# Patient Record
Sex: FEMALE | Race: WHITE | Employment: FULL TIME | URBAN - METROPOLITAN AREA
[De-identification: names, ages, dates, MRNs, and addresses within clinical notes are randomized per-mention and may not be internally consistent; named-entity substitution may affect disease eponyms.]

---

## 2017-02-07 PROBLEM — D22.5: Status: ACTIVE | Noted: 2017-02-07

## 2017-02-07 PROBLEM — D22.9 HALO NEVUS: Status: ACTIVE | Noted: 2017-02-07

## 2017-02-07 PROBLEM — L82.1 SEBORRHEIC KERATOSES: Status: ACTIVE | Noted: 2017-02-07

## 2017-02-07 PROBLEM — D23.5 BENIGN NEOPLASM OF SKIN OF TRUNK: Status: ACTIVE | Noted: 2017-02-07

## 2017-02-11 ENCOUNTER — APPOINTMENT (OUTPATIENT)
Dept: GENERAL RADIOLOGY | Age: 47
End: 2017-02-11
Attending: EMERGENCY MEDICINE
Payer: COMMERCIAL

## 2017-02-11 ENCOUNTER — HOSPITAL ENCOUNTER (OUTPATIENT)
Facility: HOSPITAL | Age: 47
Setting detail: OBSERVATION
Discharge: HOME | End: 2017-02-13
Attending: EMERGENCY MEDICINE | Admitting: INTERNAL MEDICINE
Payer: COMMERCIAL

## 2017-02-11 DIAGNOSIS — R07.9 ACUTE CHEST PAIN: Primary | ICD-10-CM

## 2017-02-11 LAB
ALBUMIN SERPL-MCNC: 4.2 G/DL (ref 3.5–4.8)
ALP LIVER SERPL-CCNC: 76 U/L (ref 39–100)
ALT SERPL-CCNC: 23 U/L (ref 14–54)
AST SERPL-CCNC: 15 U/L (ref 15–41)
ATRIAL RATE: 66 BPM
ATRIAL RATE: 79 BPM
BASOPHILS # BLD AUTO: 0.04 X10(3) UL (ref 0–0.1)
BASOPHILS NFR BLD AUTO: 0.9 %
BILIRUB SERPL-MCNC: 0.7 MG/DL (ref 0.1–2)
BUN BLD-MCNC: 17 MG/DL (ref 8–20)
CALCIUM BLD-MCNC: 9 MG/DL (ref 8.3–10.3)
CHLORIDE: 107 MMOL/L (ref 101–111)
CK: 66 IU/L (ref 26–192)
CO2: 28 MMOL/L (ref 22–32)
CREAT BLD-MCNC: 0.86 MG/DL (ref 0.55–1.02)
D-DIMER: <0.27 UG/ML FEU (ref 0–0.49)
EOSINOPHIL # BLD AUTO: 0.04 X10(3) UL (ref 0–0.3)
EOSINOPHIL NFR BLD AUTO: 0.9 %
ERYTHROCYTE [DISTWIDTH] IN BLOOD BY AUTOMATED COUNT: 12.9 % (ref 11.5–16)
GLUCOSE BLD-MCNC: 91 MG/DL (ref 70–99)
HCT VFR BLD AUTO: 41.7 % (ref 34–50)
HGB BLD-MCNC: 13.6 G/DL (ref 12–16)
IMMATURE GRANULOCYTE COUNT: 0.01 X10(3) UL (ref 0–1)
IMMATURE GRANULOCYTE RATIO %: 0.2 %
LYMPHOCYTES # BLD AUTO: 1.56 X10(3) UL (ref 0.9–4)
LYMPHOCYTES NFR BLD AUTO: 33.3 %
M PROTEIN MFR SERPL ELPH: 7.8 G/DL (ref 6.1–8.3)
MCH RBC QN AUTO: 28.1 PG (ref 27–33.2)
MCHC RBC AUTO-ENTMCNC: 32.6 G/DL (ref 31–37)
MCV RBC AUTO: 86.2 FL (ref 81–100)
MONOCYTES # BLD AUTO: 0.3 X10(3) UL (ref 0.1–0.6)
MONOCYTES NFR BLD AUTO: 6.4 %
NEUTROPHIL ABS PRELIM: 2.74 X10 (3) UL (ref 1.3–6.7)
NEUTROPHILS # BLD AUTO: 2.74 X10(3) UL (ref 1.3–6.7)
NEUTROPHILS NFR BLD AUTO: 58.3 %
P AXIS: 45 DEGREES
P AXIS: 46 DEGREES
P-R INTERVAL: 156 MS
P-R INTERVAL: 164 MS
PLATELET # BLD AUTO: 257 10(3)UL (ref 150–450)
POTASSIUM SERPL-SCNC: 3.9 MMOL/L (ref 3.6–5.1)
Q-T INTERVAL: 352 MS
Q-T INTERVAL: 408 MS
QRS DURATION: 94 MS
QRS DURATION: 96 MS
QTC CALCULATION (BEZET): 403 MS
QTC CALCULATION (BEZET): 427 MS
R AXIS: 38 DEGREES
R AXIS: 44 DEGREES
RBC # BLD AUTO: 4.84 X10(6)UL (ref 3.8–5.1)
RED CELL DISTRIBUTION WIDTH-SD: 40.6 FL (ref 35.1–46.3)
SODIUM SERPL-SCNC: 143 MMOL/L (ref 136–144)
T AXIS: 12 DEGREES
T AXIS: 26 DEGREES
TROPONIN: <0.046 NG/ML (ref ?–0.05)
VENTRICULAR RATE: 66 BPM
VENTRICULAR RATE: 79 BPM
WBC # BLD AUTO: 4.7 X10(3) UL (ref 4–13)

## 2017-02-11 PROCEDURE — 93005 ELECTROCARDIOGRAM TRACING: CPT

## 2017-02-11 PROCEDURE — 85378 FIBRIN DEGRADE SEMIQUANT: CPT | Performed by: HOSPITALIST

## 2017-02-11 PROCEDURE — 80053 COMPREHEN METABOLIC PANEL: CPT | Performed by: EMERGENCY MEDICINE

## 2017-02-11 PROCEDURE — 93010 ELECTROCARDIOGRAM REPORT: CPT

## 2017-02-11 PROCEDURE — 85025 COMPLETE CBC W/AUTO DIFF WBC: CPT | Performed by: EMERGENCY MEDICINE

## 2017-02-11 PROCEDURE — 99285 EMERGENCY DEPT VISIT HI MDM: CPT

## 2017-02-11 PROCEDURE — 96360 HYDRATION IV INFUSION INIT: CPT

## 2017-02-11 PROCEDURE — 82550 ASSAY OF CK (CPK): CPT | Performed by: INTERNAL MEDICINE

## 2017-02-11 PROCEDURE — 71010 XR CHEST AP PORTABLE  (CPT=71010): CPT

## 2017-02-11 PROCEDURE — 84484 ASSAY OF TROPONIN QUANT: CPT | Performed by: INTERNAL MEDICINE

## 2017-02-11 PROCEDURE — 84484 ASSAY OF TROPONIN QUANT: CPT | Performed by: EMERGENCY MEDICINE

## 2017-02-11 RX ORDER — ACETAMINOPHEN 500 MG
1000 TABLET ORAL ONCE
Status: COMPLETED | OUTPATIENT
Start: 2017-02-11 | End: 2017-02-11

## 2017-02-11 RX ORDER — ASPIRIN 81 MG/1
81 TABLET ORAL DAILY
Status: DISCONTINUED | OUTPATIENT
Start: 2017-02-11 | End: 2017-02-13

## 2017-02-11 RX ORDER — ASPIRIN 81 MG/1
324 TABLET, CHEWABLE ORAL ONCE
Status: COMPLETED | OUTPATIENT
Start: 2017-02-11 | End: 2017-02-11

## 2017-02-11 RX ORDER — NITROGLYCERIN 0.4 MG/1
0.4 TABLET SUBLINGUAL EVERY 5 MIN PRN
Status: DISCONTINUED | OUTPATIENT
Start: 2017-02-11 | End: 2017-02-13

## 2017-02-11 NOTE — CONSULTS
[facility] Cardiology  Report of Consultation    Brent Sun Patient Status:  Observation    10/27/1970 MRN [id number]   [facility] 2NE-A Attending Deanna Davies MD   Hosp Day # 0 PCP Leora Perez MD equivalent, 1 Glasses of wine per week       Comment: Social-rare             Medications:   Scheduled:        Continuous Infusion:        PRN Medications:   nitroGLYCERIN    Outpatient Medications:     No current facility-administered medications on file gross deficit appreciated. Integument:  No visible rashes are appreciated.       Laboratories and Data:   Labs:    Recent Labs   Lab  02/11/17   1026   GLU  91   BUN  17   CREATSERUM  0.86   CA  9.0   ALB  4.2   NA  143   K  3.9   CL  107   CO2  28.0   ALK

## 2017-02-11 NOTE — ED PROVIDER NOTES
Patient Seen in: THE [facility] Emergency Department In [place]    History   Patient presents with:  Chest Pain Angina (cardiovascular)    Stated Complaint: chest pain     HPI  Patient is a 44-year-old female who states that she was feeling fine this morning 0 Standard drinks or equivalent per week       Comment: Maxwell Larios   denies drugs, no alcohol    Review of Systems    Positive for stated complaint: chest pain   Other systems are as noted in HPI. Constitutional and vital signs reviewed.       All othe Final result                 Please view results for these tests on the individual orders.    RAINBOW DRAW BLUE   RAINBOW DRAW GOLD   RAINBOW DRAW LAVENDER   RAINBOW DRAW LIGHT GREEN   CBC W/ DIFFERENTIAL      EKG    Rate, intervals a

## 2017-02-11 NOTE — PLAN OF CARE
Problem: NEUROLOGICAL - ADULT  Goal: Achieves stable or improved neurological status  INTERVENTIONS  - Assess for and report changes in neurological status  - Initiate measures to prevent increased intracranial pressure  - Maintain blood pressure and fluid signs, obtain 12 lead EKG if indicated  - Evaluate effectiveness of antiarrhythmic and heart rate control medications as ordered  - Initiate emergency measures for life threatening arrhythmias  - Monitor electrolytes and administer replacement therapy as o

## 2017-02-11 NOTE — H&P
[facility] Hospitalist History and Physical      CC: Chest pain    PCP: Carlos Nguyen MD        History of Present Illness: Patient is a 51year old female with hx of hyperparathyroidism and kidney stones s/p partial parathyroidectomy who presented to the P bruising  Neuro: Negative for dizziness or falls    All other systems reviewed and are negative.      Objective:  /79 mmHg  Pulse 75  Temp(Src) 98.2 °F (36.8 °C) (Oral)  Resp 18  Ht 167.6 cm (5' 6\")  Wt 151 lb 0.2 oz (68.5 kg)  BMI 24.39 kg/m2  SpO2 EKG looks OK  - ECHO pending  - She has some mild pain when I saw her with deep breaths- check Ddimer if positive check CTA  - She was given ASA    FEN:  - General diet  - Lytes prn    Proph:  - DVT proph with SCDs    Dispo:  - [facility] hospitalist service  - C

## 2017-02-11 NOTE — PLAN OF CARE
NURSING ADMISSION NOTE      Patient admitted via ambulance from [address] [place] [address] Road to room. Safety precautions initiated. Bed in low position. Call light in reach.   Denies any chest pain at this time  Plan of care updated wit patient  Verbalize

## 2017-02-11 NOTE — ED NOTES
Updated this patient on the plan of care.  THE [facility] ambulance contacted for transport

## 2017-02-12 ENCOUNTER — APPOINTMENT (OUTPATIENT)
Dept: CV DIAGNOSTICS | Facility: HOSPITAL | Age: 47
End: 2017-02-12
Attending: INTERNAL MEDICINE
Payer: COMMERCIAL

## 2017-02-12 LAB
ATRIAL RATE: 65 BPM
CK: 64 IU/L (ref 26–192)
P AXIS: 41 DEGREES
P-R INTERVAL: 164 MS
Q-T INTERVAL: 410 MS
QRS DURATION: 94 MS
QTC CALCULATION (BEZET): 426 MS
R AXIS: 44 DEGREES
T AXIS: 23 DEGREES
TROPONIN: <0.046 NG/ML (ref ?–0.05)
VENTRICULAR RATE: 65 BPM

## 2017-02-12 PROCEDURE — 93005 ELECTROCARDIOGRAM TRACING: CPT

## 2017-02-12 PROCEDURE — 93306 TTE W/DOPPLER COMPLETE: CPT | Performed by: INTERNAL MEDICINE

## 2017-02-12 PROCEDURE — 93010 ELECTROCARDIOGRAM REPORT: CPT | Performed by: INTERNAL MEDICINE

## 2017-02-12 PROCEDURE — 36415 COLL VENOUS BLD VENIPUNCTURE: CPT

## 2017-02-12 PROCEDURE — 93306 TTE W/DOPPLER COMPLETE: CPT

## 2017-02-12 RX ORDER — ACETAMINOPHEN 325 MG/1
650 TABLET ORAL EVERY 6 HOURS PRN
Status: DISCONTINUED | OUTPATIENT
Start: 2017-02-12 | End: 2017-02-13

## 2017-02-12 NOTE — PLAN OF CARE
CARDIOVASCULAR - ADULT    • Maintains optimal cardiac output and hemodynamic stability Progressing    • Absence of cardiac arrhythmias or at baseline Progressing        GENITOURINARY - ADULT    • Absence of urinary retention Progressing        NEUROLOGICAL

## 2017-02-12 NOTE — PLAN OF CARE
Problem: NEUROLOGICAL - ADULT  Goal: Achieves stable or improved neurological status  INTERVENTIONS  - Assess for and report changes in neurological status  - Initiate measures to prevent increased intracranial pressure  - Maintain blood pressure and fluid temperature  - Assess for signs of decreased coronary artery perfusion - ex.  Angina  - Evaluate fluid balance, assess for edema, trend weights   Outcome: Progressing  Denies chest pain or shortness of breath  Goal: Absence of cardiac arrhythmias or at base withpatient  Stress   Echo tomorrow  2DEcho this morning

## 2017-02-12 NOTE — PROGRESS NOTES
[facility] Cardiology Progress Note        Pillo Quevedo Patient Status:  Observation    10/27/1970 MRN [id number]   [facility] 2NE-A Attending Mando Callejas Day # 1 PCP Geneva Brady, ischemia              -Normal initial CV isoenzymes  2.   6/16  3.  H/O hyperparathyroidism s/p partial parathyroidectomy        Plan:  1.  ASA  2.  troponins negative x3, EKG stable   3.  Echo pending   4.  stress echo tomorrow          WBEENA Mcdonough

## 2017-02-13 ENCOUNTER — APPOINTMENT (OUTPATIENT)
Dept: CV DIAGNOSTICS | Facility: HOSPITAL | Age: 47
End: 2017-02-13
Attending: NURSE PRACTITIONER
Payer: COMMERCIAL

## 2017-02-13 VITALS
DIASTOLIC BLOOD PRESSURE: 67 MMHG | BODY MASS INDEX: 24.27 KG/M2 | WEIGHT: 151 LBS | HEIGHT: 66 IN | TEMPERATURE: 98 F | SYSTOLIC BLOOD PRESSURE: 119 MMHG | HEART RATE: 79 BPM | RESPIRATION RATE: 18 BRPM | OXYGEN SATURATION: 98 %

## 2017-02-13 PROCEDURE — 93018 CV STRESS TEST I&R ONLY: CPT | Performed by: INTERNAL MEDICINE

## 2017-02-13 PROCEDURE — 93350 STRESS TTE ONLY: CPT | Performed by: INTERNAL MEDICINE

## 2017-02-13 PROCEDURE — 93017 CV STRESS TEST TRACING ONLY: CPT

## 2017-02-13 PROCEDURE — 93350 STRESS TTE ONLY: CPT

## 2017-02-13 NOTE — PLAN OF CARE
Discharge order received; ok to discharge per cardiology. Discharge instructions given to patient who stated to understand. IV removed with no problems. VSS. Pt stable, waiting for wheelchair.

## 2017-02-13 NOTE — PROGRESS NOTES
PRELIMINARY CARDIODIAGNOSTICS REPORT    1.5 mm ST depression with exercise noted. No symptoms or arrhythmias. Pt walked for 8:01 achieving a max heart rate of 171 (99% APMHR). Peak BP was 192/82. Test was terminated due to fatigue. Echo pending.

## 2017-02-13 NOTE — PROGRESS NOTES
[facility] Hospitalist Progress Note                                                                   [address]  10/27/1970    CC: FU CP    Interval History:  - Feels well, no recurre repeat EKG looks OK  - ECHO reviewed, no RWMA, nl EF  - She has some mild pain when I saw her with deep breaths on admit- Ddimer negative, no recurrence  - She was given ASA  - Plan for stress test in AM per Cardiology, discussed plan or care with Dr. Jeff Lee

## 2017-02-13 NOTE — PLAN OF CARE
Patient stable, denies any chest pain or shortness of breath, no distress noted. VSS. Stress echo this am.      Tele: NSR, no arrhythmias noted. No edema. Lungs: Clear, RA, Sp02 >96%. Respirations easy and regular.   GI: Abd soft, non-tender, bowel gwen

## 2017-02-13 NOTE — PAYOR COMM NOTE
Attending Physician: Brittany Mancera,*    Review Type: ADMISSION   Reviewer: Sonia Landaverde       Date: February 13, 2017 - 7:54 AM  Payor: [address] [place] [address]  Authorization Number: [id number]  Admit date: 2/11/2017 10:16 AM   Admitted from Emergency venous thrombolism with a negative predictive value of  approximately 95% when results are used as part of the total clinical  evaluation of the patient.     1st Trimester:  0.05-0.95 ug/mL (FEU)     2nd Trimester:  0.32-1.29 ug/mL (FEU)    3rd Trimester:

## 2017-02-14 NOTE — DISCHARGE SUMMARY
[facility] Internal Medicine Discharge Summary   Patient ID:  Amadeo Wells  [id number]  51year old  10/27/1970    Admit date: 2/11/2017    Discharge date and time: 2/13/2017     Attending Physician: Dr. Laura Robertson    Primary Care Physician: Adolphus Hammans, Succinate 50 MG Tabs   Commonly known as:  IMITREX   One po prn migraine. May repeat dose in 2 hrs prn.  Max 2 pills per day       ValACYclovir HCl 1 G Tabs   Commonly known as:  VALTREX   2 tablets by mouth every 12 hours for 1 day at onset of symptoms

## 2017-02-16 PROCEDURE — 82306 VITAMIN D 25 HYDROXY: CPT | Performed by: PHYSICIAN ASSISTANT

## 2017-02-16 PROCEDURE — 84443 ASSAY THYROID STIM HORMONE: CPT | Performed by: PHYSICIAN ASSISTANT

## 2017-02-16 PROCEDURE — 36415 COLL VENOUS BLD VENIPUNCTURE: CPT | Performed by: PHYSICIAN ASSISTANT

## 2017-06-21 PROCEDURE — 83001 ASSAY OF GONADOTROPIN (FSH): CPT | Performed by: INTERNAL MEDICINE

## 2017-06-21 PROCEDURE — 82607 VITAMIN B-12: CPT | Performed by: PHYSICIAN ASSISTANT

## 2017-06-21 PROCEDURE — 82746 ASSAY OF FOLIC ACID SERUM: CPT | Performed by: PHYSICIAN ASSISTANT

## 2017-08-24 ENCOUNTER — HOSPITAL ENCOUNTER (OUTPATIENT)
Age: 47
Discharge: HOME | End: 2017-08-24
Attending: FAMILY MEDICINE
Payer: COMMERCIAL

## 2017-08-24 ENCOUNTER — APPOINTMENT (OUTPATIENT)
Dept: CT IMAGING | Age: 47
End: 2017-08-24
Attending: FAMILY MEDICINE
Payer: COMMERCIAL

## 2017-08-24 VITALS
DIASTOLIC BLOOD PRESSURE: 69 MMHG | HEART RATE: 69 BPM | RESPIRATION RATE: 20 BRPM | OXYGEN SATURATION: 97 % | TEMPERATURE: 98 F | SYSTOLIC BLOOD PRESSURE: 118 MMHG

## 2017-08-24 DIAGNOSIS — N20.0 RENAL LITHIASIS: ICD-10-CM

## 2017-08-24 DIAGNOSIS — R10.32 LLQ ABDOMINAL PAIN: Primary | ICD-10-CM

## 2017-08-24 LAB
#LYMPHOCYTE IC: 1.2 X10ˆ3/UL (ref 0.9–3.2)
#MXD IC: 0.6 X10ˆ3/UL (ref 0.1–1)
#NEUTROPHIL IC: 11 X10ˆ3/UL (ref 1.3–6.7)
CREAT SERPL-MCNC: 0.8 MG/DL (ref 0.4–1)
GLUCOSE BLD-MCNC: 113 MG/DL (ref 65–99)
HCT IC: 43.6 % (ref 37–54)
HGB IC: 14.3 G/DL (ref 11.7–16)
ISTAT BLOOD GAS TCO2: 26 MMOL/L (ref 22–32)
ISTAT BUN: 14 MG/DL (ref 8–20)
ISTAT CHLORIDE: 101 MMOL/L (ref 101–111)
ISTAT HEMATOCRIT: 45 % (ref 37–54)
ISTAT IONIZED CALCIUM: 1.21 MMOL/L (ref 1.12–1.32)
ISTAT POTASSIUM: 3.7 MMOL/L (ref 3.6–5.1)
ISTAT SODIUM: 142 MMOL/L (ref 136–144)
LYMPHOCYTES NFR BLD AUTO: 9.5 %
MCH IC: 28.5 PG (ref 27–33.2)
MCHC IC: 32.8 G/DL (ref 31–37)
MCV IC: 87 FL (ref 81–100)
MIXED CELL %: 5 %
NEUTROPHILS NFR BLD AUTO: 85.5 %
PLT IC: 254 X10ˆ3/UL (ref 150–450)
POCT BILIRUBIN URINE: NEGATIVE
POCT GLUCOSE URINE: NEGATIVE MG/DL
POCT KETONE URINE: NEGATIVE MG/DL
POCT LEUKOCYTE ESTERASE URINE: NEGATIVE
POCT NITRITE URINE: NEGATIVE
POCT PH URINE: 5.5 (ref 5–8)
POCT PROTEIN URINE: 30 MG/DL
POCT SPECIFIC GRAVITY URINE: 1.02
POCT URINE COLOR: YELLOW
POCT URINE PREGNANCY: NEGATIVE
POCT UROBILINOGEN URINE: 0.2 MG/DL
RBC IC: 5.01 X10ˆ6/UL (ref 3.8–5.1)
WBC IC: 12.8 X10ˆ3/UL (ref 4–13)

## 2017-08-24 PROCEDURE — 81025 URINE PREGNANCY TEST: CPT | Performed by: FAMILY MEDICINE

## 2017-08-24 PROCEDURE — 80047 BASIC METABLC PNL IONIZED CA: CPT

## 2017-08-24 PROCEDURE — 85025 COMPLETE CBC W/AUTO DIFF WBC: CPT | Performed by: FAMILY MEDICINE

## 2017-08-24 PROCEDURE — 74176 CT ABD & PELVIS W/O CONTRAST: CPT | Performed by: FAMILY MEDICINE

## 2017-08-24 PROCEDURE — 81002 URINALYSIS NONAUTO W/O SCOPE: CPT | Performed by: FAMILY MEDICINE

## 2017-08-24 PROCEDURE — 99204 OFFICE O/P NEW MOD 45 MIN: CPT

## 2017-08-24 PROCEDURE — 99214 OFFICE O/P EST MOD 30 MIN: CPT

## 2017-08-24 RX ORDER — KETOROLAC TROMETHAMINE 30 MG/ML
30 INJECTION, SOLUTION INTRAMUSCULAR; INTRAVENOUS ONCE
Status: COMPLETED | OUTPATIENT
Start: 2017-08-24 | End: 2017-08-24

## 2017-08-24 NOTE — ED INITIAL ASSESSMENT (HPI)
Pt c/o left lower quadrant pain since this morning. Also reports nausea, chills. Denies fever. Pain does not radiate. Denies urinary symptoms.

## 2017-08-24 NOTE — ED PROVIDER NOTES
Patient Seen in: [facility] END    History   Patient presents with:  Abdomen/Flank Pain (GI/)    Stated Complaint: Svere abominal pain 1 day    HPI    This 26-year-old female presents to the office with left lower quadrant abdomin prn. Max 2 pills per day   ValACYclovir HCl 1 G Oral Tab,  2 tablets by mouth every 12 hours for 1 day at onset of symptoms       Family History   Problem Relation Age of Onset   • Heart Disorder Father 67   • Stroke Mother    • Cancer Mother      cervical Abnormal; Notable for the following:        Result Value    Urine Clarity Turbid (*)     Blood, Urine Large (*)     Protein urine 30  (*)     All other components within normal limits   POCT CBC - Abnormal; Notable for the following:     # Neutrophil 11.0 moderate left-sided hydronephrosis. No obstructing urolithiasis is present. Differential may include a recently passed stone, infectious etiology or possible UPJ narrowing. #2. Nonobstructing bilateral renal calculi.     Dictated by: Janna Zaman MD on

## 2017-11-13 ENCOUNTER — APPOINTMENT (OUTPATIENT)
Dept: CT IMAGING | Facility: HOSPITAL | Age: 47
End: 2017-11-13
Attending: EMERGENCY MEDICINE
Payer: COMMERCIAL

## 2017-11-13 ENCOUNTER — HOSPITAL ENCOUNTER (EMERGENCY)
Facility: HOSPITAL | Age: 47
Discharge: HOME | End: 2017-11-13
Attending: EMERGENCY MEDICINE
Payer: COMMERCIAL

## 2017-11-13 VITALS
WEIGHT: 152 LBS | DIASTOLIC BLOOD PRESSURE: 83 MMHG | TEMPERATURE: 98 F | BODY MASS INDEX: 23.86 KG/M2 | HEIGHT: 67 IN | RESPIRATION RATE: 16 BRPM | HEART RATE: 74 BPM | OXYGEN SATURATION: 100 % | SYSTOLIC BLOOD PRESSURE: 122 MMHG

## 2017-11-13 DIAGNOSIS — R51.9 NONINTRACTABLE HEADACHE, UNSPECIFIED CHRONICITY PATTERN, UNSPECIFIED HEADACHE TYPE: Primary | ICD-10-CM

## 2017-11-13 PROCEDURE — 70450 CT HEAD/BRAIN W/O DYE: CPT | Performed by: EMERGENCY MEDICINE

## 2017-11-13 PROCEDURE — 85025 COMPLETE CBC W/AUTO DIFF WBC: CPT | Performed by: EMERGENCY MEDICINE

## 2017-11-13 PROCEDURE — 80048 BASIC METABOLIC PNL TOTAL CA: CPT | Performed by: EMERGENCY MEDICINE

## 2017-11-13 PROCEDURE — 99285 EMERGENCY DEPT VISIT HI MDM: CPT

## 2017-11-13 PROCEDURE — 36415 COLL VENOUS BLD VENIPUNCTURE: CPT

## 2017-11-13 RX ORDER — ACETAMINOPHEN 500 MG
1000 TABLET ORAL ONCE
Status: COMPLETED | OUTPATIENT
Start: 2017-11-13 | End: 2017-11-13

## 2017-11-13 RX ORDER — KETOROLAC TROMETHAMINE 30 MG/ML
30 INJECTION, SOLUTION INTRAMUSCULAR; INTRAVENOUS ONCE
Status: DISCONTINUED | OUTPATIENT
Start: 2017-11-13 | End: 2017-11-13

## 2017-11-13 RX ORDER — MORPHINE SULFATE 4 MG/ML
4 INJECTION, SOLUTION INTRAMUSCULAR; INTRAVENOUS EVERY 30 MIN PRN
Status: DISCONTINUED | OUTPATIENT
Start: 2017-11-13 | End: 2017-11-13

## 2017-11-13 RX ORDER — ONDANSETRON 2 MG/ML
4 INJECTION INTRAMUSCULAR; INTRAVENOUS ONCE
Status: DISCONTINUED | OUTPATIENT
Start: 2017-11-13 | End: 2017-11-13

## 2017-11-13 NOTE — ED PROVIDER NOTES
Patient Seen in: [facility] Emergency Department    History   Patient presents with:  Headache (neurologic)    Stated Complaint: headache, rt side visual loss, rt sided weakness.  Pt states vision loss & weakn*    HPI    Odilia Waters is a pleasant 47-year- Alcohol use: Yes           0.6 oz/week     Glasses of wine: 1 per week     Comment: Social-rare      Review of Systems    Positive for stated complaint: headache, rt side visual loss, rt sided weakness.  Pt states vision loss & weakn*  Other systems are a -----------         ------                     CBC W/ DIFFERENTIAL[[id number]]          Normal              Final result                 Please view results for these tests on the individual orders.        ED Course as of Nov 13 1541  --------------------

## 2017-11-13 NOTE — ED INITIAL ASSESSMENT (HPI)
Patient reports headache for past 2 days and around 9 am today she developed vision changes to right eye associated with numbness/tingling to right side of face and right hand/arm, right leg. Reports she \"just felt out of it\".  Reports these symptoms last

## 2018-08-22 PROBLEM — Z86.39 HISTORY OF VITAMIN D DEFICIENCY: Status: ACTIVE | Noted: 2018-08-22

## 2023-12-23 ENCOUNTER — APPOINTMENT (OUTPATIENT)
Dept: CT IMAGING | Age: 53
End: 2023-12-23
Attending: EMERGENCY MEDICINE
Payer: COMMERCIAL

## 2023-12-23 ENCOUNTER — HOSPITAL ENCOUNTER (EMERGENCY)
Age: 53
Discharge: HOME | End: 2023-12-23
Attending: EMERGENCY MEDICINE
Payer: COMMERCIAL

## 2023-12-23 VITALS
RESPIRATION RATE: 16 BRPM | HEIGHT: 66 IN | SYSTOLIC BLOOD PRESSURE: 133 MMHG | OXYGEN SATURATION: 98 % | WEIGHT: 161 LBS | DIASTOLIC BLOOD PRESSURE: 61 MMHG | HEART RATE: 70 BPM | TEMPERATURE: 97 F | BODY MASS INDEX: 25.88 KG/M2

## 2023-12-23 DIAGNOSIS — N20.0 KIDNEY STONE: Primary | ICD-10-CM

## 2023-12-23 LAB
ALBUMIN SERPL-MCNC: 4.3 G/DL (ref 3.4–5)
ALBUMIN/GLOB SERPL: 1.2 {RATIO} (ref 1–2)
ALP LIVER SERPL-CCNC: 86 U/L
ALT SERPL-CCNC: 23 U/L
ANION GAP SERPL CALC-SCNC: 5 MMOL/L (ref 0–18)
AST SERPL-CCNC: 18 U/L (ref 15–37)
BASOPHILS # BLD AUTO: 0.03 X10(3) UL (ref 0–0.2)
BASOPHILS NFR BLD AUTO: 0.2 %
BILIRUB SERPL-MCNC: 0.6 MG/DL (ref 0.1–2)
BILIRUB UR QL STRIP.AUTO: NEGATIVE
BUN BLD-MCNC: 22 MG/DL (ref 9–23)
CALCIUM BLD-MCNC: 9.3 MG/DL (ref 8.5–10.1)
CHLORIDE SERPL-SCNC: 107 MMOL/L (ref 98–112)
CLARITY UR REFRACT.AUTO: CLEAR
CO2 SERPL-SCNC: 27 MMOL/L (ref 21–32)
COLOR UR AUTO: YELLOW
CREAT BLD-MCNC: 1.42 MG/DL
EGFRCR SERPLBLD CKD-EPI 2021: 44 ML/MIN/1.73M2 (ref 60–?)
EOSINOPHIL # BLD AUTO: 0 X10(3) UL (ref 0–0.7)
EOSINOPHIL NFR BLD AUTO: 0 %
ERYTHROCYTE [DISTWIDTH] IN BLOOD BY AUTOMATED COUNT: 12.9 %
GLOBULIN PLAS-MCNC: 3.6 G/DL (ref 2.8–4.4)
GLUCOSE BLD-MCNC: 144 MG/DL (ref 70–99)
GLUCOSE UR STRIP.AUTO-MCNC: NEGATIVE MG/DL
HCT VFR BLD AUTO: 42.4 %
HGB BLD-MCNC: 14 G/DL
IMM GRANULOCYTES # BLD AUTO: 0.04 X10(3) UL (ref 0–1)
IMM GRANULOCYTES NFR BLD: 0.3 %
KETONES UR STRIP.AUTO-MCNC: NEGATIVE MG/DL
LEUKOCYTE ESTERASE UR QL STRIP.AUTO: NEGATIVE
LYMPHOCYTES # BLD AUTO: 0.73 X10(3) UL (ref 1–4)
LYMPHOCYTES NFR BLD AUTO: 6 %
MCH RBC QN AUTO: 28.7 PG (ref 26–34)
MCHC RBC AUTO-ENTMCNC: 33 G/DL (ref 31–37)
MCV RBC AUTO: 87.1 FL
MONOCYTES # BLD AUTO: 0.35 X10(3) UL (ref 0.1–1)
MONOCYTES NFR BLD AUTO: 2.9 %
NEUTROPHILS # BLD AUTO: 10.98 X10 (3) UL (ref 1.5–7.7)
NEUTROPHILS # BLD AUTO: 10.98 X10(3) UL (ref 1.5–7.7)
NEUTROPHILS NFR BLD AUTO: 90.6 %
NITRITE UR QL STRIP.AUTO: NEGATIVE
OSMOLALITY SERPL CALC.SUM OF ELEC: 294 MOSM/KG (ref 275–295)
PH UR STRIP.AUTO: 7 [PH] (ref 5–8)
PLATELET # BLD AUTO: 245 10(3)UL (ref 150–450)
POTASSIUM SERPL-SCNC: 4.3 MMOL/L (ref 3.5–5.1)
PROT SERPL-MCNC: 7.9 G/DL (ref 6.4–8.2)
RBC # BLD AUTO: 4.87 X10(6)UL
RBC #/AREA URNS AUTO: >10 /HPF
SODIUM SERPL-SCNC: 139 MMOL/L (ref 136–145)
SP GR UR STRIP.AUTO: 1.02 (ref 1–1.03)
UROBILINOGEN UR STRIP.AUTO-MCNC: 0.2 MG/DL
WBC # BLD AUTO: 12.1 X10(3) UL (ref 4–11)

## 2023-12-23 PROCEDURE — 96375 TX/PRO/DX INJ NEW DRUG ADDON: CPT

## 2023-12-23 PROCEDURE — 81015 MICROSCOPIC EXAM OF URINE: CPT | Performed by: EMERGENCY MEDICINE

## 2023-12-23 PROCEDURE — 99284 EMERGENCY DEPT VISIT MOD MDM: CPT

## 2023-12-23 PROCEDURE — 81001 URINALYSIS AUTO W/SCOPE: CPT | Performed by: EMERGENCY MEDICINE

## 2023-12-23 PROCEDURE — 80053 COMPREHEN METABOLIC PANEL: CPT | Performed by: EMERGENCY MEDICINE

## 2023-12-23 PROCEDURE — 74176 CT ABD & PELVIS W/O CONTRAST: CPT | Performed by: EMERGENCY MEDICINE

## 2023-12-23 PROCEDURE — 96361 HYDRATE IV INFUSION ADD-ON: CPT

## 2023-12-23 PROCEDURE — 85025 COMPLETE CBC W/AUTO DIFF WBC: CPT | Performed by: EMERGENCY MEDICINE

## 2023-12-23 PROCEDURE — 99285 EMERGENCY DEPT VISIT HI MDM: CPT

## 2023-12-23 PROCEDURE — 96374 THER/PROPH/DIAG INJ IV PUSH: CPT

## 2023-12-23 RX ORDER — NAPROXEN 500 MG/1
500 TABLET ORAL 2 TIMES DAILY PRN
Qty: 14 TABLET | Refills: 0 | Status: SHIPPED | OUTPATIENT
Start: 2023-12-23 | End: 2023-12-30

## 2023-12-23 RX ORDER — KETOROLAC TROMETHAMINE 15 MG/ML
15 INJECTION, SOLUTION INTRAMUSCULAR; INTRAVENOUS ONCE
Status: COMPLETED | OUTPATIENT
Start: 2023-12-23 | End: 2023-12-23

## 2023-12-23 RX ORDER — HYDROCODONE BITARTRATE AND ACETAMINOPHEN 5; 325 MG/1; MG/1
1-2 TABLET ORAL EVERY 6 HOURS PRN
Qty: 10 TABLET | Refills: 0 | Status: SHIPPED | OUTPATIENT
Start: 2023-12-23

## 2023-12-23 RX ORDER — ONDANSETRON 2 MG/ML
4 INJECTION INTRAMUSCULAR; INTRAVENOUS ONCE
Status: COMPLETED | OUTPATIENT
Start: 2023-12-23 | End: 2023-12-23

## 2023-12-23 RX ORDER — ONDANSETRON 4 MG/1
4 TABLET, ORALLY DISINTEGRATING ORAL EVERY 4 HOURS PRN
Qty: 10 TABLET | Refills: 0 | Status: SHIPPED | OUTPATIENT
Start: 2023-12-23 | End: 2023-12-30

## 2023-12-23 NOTE — ED QUICK NOTES
Pt's pain 1-2/10. Instructed that if pain is getting worse to come back to ED. Verbalized of understanding. Daughter here to .

## 2023-12-23 NOTE — DISCHARGE INSTRUCTIONS
Follow-up for further evaluation with urology. Call for an appointment. Return if uncontrolled pain, uncontrolled vomiting, fever greater 100.3, new or worse symptoms. Rest, plenty of fluids. Naproxen first.  Norco for breakthrough pain. Zofran as needed for nausea.

## (undated) NOTE — ED AVS SNAPSHOT
Jesús Dunn   MRN: [id number]    Department:  [facility] Emergency Department   Date of Visit:  11/13/2017           Disclosure     Insurance plans vary and the physician(s) referred by the ER may not be covered by your plan.  Please con If you have been prescribed any medication(s), please fill your prescription right away and begin taking the medication(s) as directed    If the emergency physician has read X-rays, these will be re-interpreted by a radiologist.  If there is a significant

## (undated) NOTE — IP AVS SNAPSHOT
[facility] [place] [address] [place], [address] ~ [phone]                Discharge Summary   2/11/2017    Mj           Admission Information        Provider Department    2/11/2017 Zion Sharif MD  2ne-A Immunization History as of 2/13/2017  Never Reviewed    INFLUENZA  Deferred (Patient Refused)    TDAP 6/14/2016      Recent Hematology Lab Results  (Last 3 results in the past 90 days)    WBC RBC Hemoglobin Hematocrit MCV MCH MCHC RDW Platelet MPV    (67/2 [address] to help you get signed up for insurance coverage. Patient [address] is a Federal Navigator program that can help with your Affordable Care Act coverage, as well as all types of Medicaid plans.   To get signed up and covere Non-Narcotic Pain Medications     SUMAtriptan Succinate 50 MG Oral Tab       Use: Treat pain, fever, inflammation   Most common side effects: Stomach upset   What to report to your healthcare team: Stomach upset, unresolved pain